# Patient Record
Sex: MALE | Race: WHITE | NOT HISPANIC OR LATINO | URBAN - METROPOLITAN AREA
[De-identification: names, ages, dates, MRNs, and addresses within clinical notes are randomized per-mention and may not be internally consistent; named-entity substitution may affect disease eponyms.]

---

## 2018-03-07 ENCOUNTER — OUTPATIENT (OUTPATIENT)
Dept: OUTPATIENT SERVICES | Facility: HOSPITAL | Age: 73
LOS: 1 days | End: 2018-03-07

## 2018-03-07 ENCOUNTER — INPATIENT (INPATIENT)
Facility: HOSPITAL | Age: 73
LOS: 2 days | Discharge: HOME | End: 2018-03-10
Payer: MEDICARE

## 2018-03-07 PROCEDURE — 73552 X-RAY EXAM OF FEMUR 2/>: CPT | Mod: 26,LT

## 2018-03-07 PROCEDURE — 73590 X-RAY EXAM OF LOWER LEG: CPT | Mod: 26,LT

## 2018-03-07 PROCEDURE — 93971 EXTREMITY STUDY: CPT | Mod: 26,LT

## 2018-03-07 PROCEDURE — 99284 EMERGENCY DEPT VISIT MOD MDM: CPT

## 2018-03-08 ENCOUNTER — OUTPATIENT (OUTPATIENT)
Dept: OUTPATIENT SERVICES | Facility: HOSPITAL | Age: 73
LOS: 1 days | End: 2018-03-08

## 2018-03-09 ENCOUNTER — OUTPATIENT (OUTPATIENT)
Dept: OUTPATIENT SERVICES | Facility: HOSPITAL | Age: 73
LOS: 1 days | End: 2018-03-09

## 2018-03-10 ENCOUNTER — OUTPATIENT (OUTPATIENT)
Dept: OUTPATIENT SERVICES | Facility: HOSPITAL | Age: 73
LOS: 1 days | End: 2018-03-10

## 2018-03-30 ENCOUNTER — TRANSCRIPTION ENCOUNTER (OUTPATIENT)
Age: 73
End: 2018-03-30

## 2018-03-30 ENCOUNTER — EMERGENCY (EMERGENCY)
Facility: HOSPITAL | Age: 73
LOS: 1 days | End: 2018-03-30
Payer: MEDICARE

## 2018-03-30 PROCEDURE — 99284 EMERGENCY DEPT VISIT MOD MDM: CPT

## 2018-03-30 PROCEDURE — 71045 X-RAY EXAM CHEST 1 VIEW: CPT | Mod: 26

## 2018-07-24 ENCOUNTER — OUTPATIENT (OUTPATIENT)
Dept: OUTPATIENT SERVICES | Facility: HOSPITAL | Age: 73
LOS: 1 days | Discharge: HOME | End: 2018-07-24

## 2018-09-21 ENCOUNTER — OUTPATIENT (OUTPATIENT)
Dept: OUTPATIENT SERVICES | Facility: HOSPITAL | Age: 73
LOS: 1 days | End: 2018-09-21

## 2018-10-05 ENCOUNTER — OUTPATIENT (OUTPATIENT)
Dept: OUTPATIENT SERVICES | Facility: HOSPITAL | Age: 73
LOS: 1 days | End: 2018-10-05

## 2018-10-11 ENCOUNTER — OUTPATIENT (OUTPATIENT)
Dept: OUTPATIENT SERVICES | Facility: HOSPITAL | Age: 73
LOS: 1 days | End: 2018-10-11

## 2021-01-18 ENCOUNTER — APPOINTMENT (OUTPATIENT)
Dept: ULTRASOUND IMAGING | Facility: CLINIC | Age: 76
End: 2021-01-18
Payer: MEDICARE

## 2021-01-18 PROCEDURE — 76536 US EXAM OF HEAD AND NECK: CPT

## 2021-04-06 ENCOUNTER — APPOINTMENT (OUTPATIENT)
Dept: ULTRASOUND IMAGING | Facility: CLINIC | Age: 76
End: 2021-04-06
Payer: MEDICARE

## 2021-04-06 PROCEDURE — 76770 US EXAM ABDO BACK WALL COMP: CPT

## 2021-09-23 PROBLEM — Z00.00 ENCOUNTER FOR PREVENTIVE HEALTH EXAMINATION: Status: ACTIVE | Noted: 2021-09-23

## 2021-09-29 ENCOUNTER — APPOINTMENT (OUTPATIENT)
Dept: UROLOGY | Facility: CLINIC | Age: 76
End: 2021-09-29
Payer: MEDICARE

## 2021-09-29 ENCOUNTER — NON-APPOINTMENT (OUTPATIENT)
Age: 76
End: 2021-09-29

## 2021-09-29 VITALS
WEIGHT: 198 LBS | HEART RATE: 64 BPM | TEMPERATURE: 97.4 F | BODY MASS INDEX: 28.35 KG/M2 | HEIGHT: 70 IN | DIASTOLIC BLOOD PRESSURE: 76 MMHG | SYSTOLIC BLOOD PRESSURE: 130 MMHG

## 2021-09-29 DIAGNOSIS — Z82.49 FAMILY HISTORY OF ISCHEMIC HEART DISEASE AND OTHER DISEASES OF THE CIRCULATORY SYSTEM: ICD-10-CM

## 2021-09-29 DIAGNOSIS — N40.0 BENIGN PROSTATIC HYPERPLASIA WITHOUT LOWER URINARY TRACT SYMPMS: ICD-10-CM

## 2021-09-29 DIAGNOSIS — M79.89 OTHER SPECIFIED SOFT TISSUE DISORDERS: ICD-10-CM

## 2021-09-29 LAB
BILIRUB UR QL STRIP: NEGATIVE
CLARITY UR: CLEAR
COLLECTION METHOD: NORMAL
GLUCOSE UR-MCNC: NEGATIVE
HCG UR QL: 0.2 EU/DL
HGB UR QL STRIP.AUTO: NEGATIVE
KETONES UR-MCNC: NEGATIVE
LEUKOCYTE ESTERASE UR QL STRIP: NEGATIVE
NITRITE UR QL STRIP: NEGATIVE
PH UR STRIP: 5.5
PROT UR STRIP-MCNC: NEGATIVE
SP GR UR STRIP: 1.01

## 2021-09-29 PROCEDURE — 99204 OFFICE O/P NEW MOD 45 MIN: CPT

## 2021-09-29 RX ORDER — LITHIUM CARBONATE 300 MG/1
300 TABLET, FILM COATED, EXTENDED RELEASE ORAL
Refills: 0 | Status: ACTIVE | COMMUNITY

## 2021-09-29 RX ORDER — ADHESIVE TAPE 3"X 2.3 YD
50 MCG TAPE, NON-MEDICATED TOPICAL
Refills: 0 | Status: ACTIVE | COMMUNITY

## 2021-10-03 LAB — BACTERIA UR CULT: NORMAL

## 2021-11-08 ENCOUNTER — APPOINTMENT (OUTPATIENT)
Dept: UROLOGY | Facility: CLINIC | Age: 76
End: 2021-11-08
Payer: MEDICARE

## 2021-11-08 VITALS
BODY MASS INDEX: 28.35 KG/M2 | DIASTOLIC BLOOD PRESSURE: 84 MMHG | HEIGHT: 70 IN | SYSTOLIC BLOOD PRESSURE: 126 MMHG | TEMPERATURE: 97.8 F | WEIGHT: 198 LBS | HEART RATE: 69 BPM

## 2021-11-08 PROCEDURE — 99213 OFFICE O/P EST LOW 20 MIN: CPT

## 2021-12-06 ENCOUNTER — APPOINTMENT (OUTPATIENT)
Dept: UROLOGY | Facility: CLINIC | Age: 76
End: 2021-12-06
Payer: MEDICARE

## 2021-12-06 VITALS
DIASTOLIC BLOOD PRESSURE: 84 MMHG | BODY MASS INDEX: 28.35 KG/M2 | TEMPERATURE: 97.5 F | HEART RATE: 75 BPM | WEIGHT: 198 LBS | HEIGHT: 70 IN | SYSTOLIC BLOOD PRESSURE: 132 MMHG

## 2021-12-06 PROCEDURE — 99213 OFFICE O/P EST LOW 20 MIN: CPT

## 2021-12-06 RX ORDER — MIRABEGRON 25 MG/1
25 TABLET, FILM COATED, EXTENDED RELEASE ORAL
Qty: 90 | Refills: 0 | Status: DISCONTINUED | COMMUNITY
Start: 2021-11-08 | End: 2021-12-06

## 2021-12-22 ENCOUNTER — NON-APPOINTMENT (OUTPATIENT)
Age: 76
End: 2021-12-22

## 2022-01-09 ENCOUNTER — NON-APPOINTMENT (OUTPATIENT)
Age: 77
End: 2022-01-09

## 2022-01-10 ENCOUNTER — RX RENEWAL (OUTPATIENT)
Age: 77
End: 2022-01-10

## 2022-01-19 ENCOUNTER — APPOINTMENT (OUTPATIENT)
Dept: UROLOGY | Facility: CLINIC | Age: 77
End: 2022-01-19
Payer: MEDICARE

## 2022-01-19 VITALS
BODY MASS INDEX: 28.35 KG/M2 | HEART RATE: 64 BPM | DIASTOLIC BLOOD PRESSURE: 77 MMHG | WEIGHT: 198 LBS | SYSTOLIC BLOOD PRESSURE: 124 MMHG | HEIGHT: 70 IN | TEMPERATURE: 97.5 F

## 2022-01-19 PROCEDURE — 51798 US URINE CAPACITY MEASURE: CPT

## 2022-01-19 PROCEDURE — 99213 OFFICE O/P EST LOW 20 MIN: CPT

## 2022-02-09 ENCOUNTER — APPOINTMENT (OUTPATIENT)
Dept: UROLOGY | Facility: CLINIC | Age: 77
End: 2022-02-09
Payer: MEDICARE

## 2022-02-09 VITALS
HEIGHT: 70 IN | BODY MASS INDEX: 27.92 KG/M2 | OXYGEN SATURATION: 98 % | HEART RATE: 68 BPM | WEIGHT: 195 LBS | DIASTOLIC BLOOD PRESSURE: 74 MMHG | TEMPERATURE: 96.8 F | SYSTOLIC BLOOD PRESSURE: 123 MMHG

## 2022-02-09 DIAGNOSIS — N32.81 OVERACTIVE BLADDER: ICD-10-CM

## 2022-02-09 LAB
BILIRUB UR QL STRIP: NEGATIVE
CLARITY UR: CLEAR
COLLECTION METHOD: NORMAL
GLUCOSE UR-MCNC: NEGATIVE
HCG UR QL: 0.2 EU/DL
HGB UR QL STRIP.AUTO: NEGATIVE
KETONES UR-MCNC: NEGATIVE
LEUKOCYTE ESTERASE UR QL STRIP: NEGATIVE
NITRITE UR QL STRIP: NEGATIVE
PH UR STRIP: 7
PROT UR STRIP-MCNC: NEGATIVE
SP GR UR STRIP: 1.01

## 2022-02-09 PROCEDURE — 81003 URINALYSIS AUTO W/O SCOPE: CPT | Mod: QW

## 2022-02-09 PROCEDURE — 51784 ANAL/URINARY MUSCLE STUDY: CPT

## 2022-02-09 PROCEDURE — 51741 ELECTRO-UROFLOWMETRY FIRST: CPT

## 2022-02-09 PROCEDURE — 51728 CYSTOMETROGRAM W/VP: CPT

## 2022-02-09 PROCEDURE — 51798 US URINE CAPACITY MEASURE: CPT

## 2022-02-09 PROCEDURE — 51797 INTRAABDOMINAL PRESSURE TEST: CPT

## 2022-04-13 ENCOUNTER — APPOINTMENT (OUTPATIENT)
Dept: UROLOGY | Facility: CLINIC | Age: 77
End: 2022-04-13
Payer: MEDICARE

## 2022-04-13 VITALS
WEIGHT: 195 LBS | HEIGHT: 70 IN | BODY MASS INDEX: 27.92 KG/M2 | DIASTOLIC BLOOD PRESSURE: 68 MMHG | TEMPERATURE: 97.3 F | SYSTOLIC BLOOD PRESSURE: 116 MMHG | HEART RATE: 82 BPM

## 2022-04-13 PROCEDURE — 99213 OFFICE O/P EST LOW 20 MIN: CPT

## 2022-06-21 ENCOUNTER — APPOINTMENT (OUTPATIENT)
Dept: ULTRASOUND IMAGING | Facility: CLINIC | Age: 77
End: 2022-06-21

## 2022-06-21 PROCEDURE — 76770 US EXAM ABDO BACK WALL COMP: CPT

## 2022-10-12 ENCOUNTER — APPOINTMENT (OUTPATIENT)
Dept: UROLOGY | Facility: CLINIC | Age: 77
End: 2022-10-12

## 2022-10-12 VITALS
DIASTOLIC BLOOD PRESSURE: 74 MMHG | SYSTOLIC BLOOD PRESSURE: 110 MMHG | BODY MASS INDEX: 28.35 KG/M2 | TEMPERATURE: 97.4 F | HEART RATE: 61 BPM | HEIGHT: 70 IN | WEIGHT: 198 LBS

## 2022-10-12 PROCEDURE — 99214 OFFICE O/P EST MOD 30 MIN: CPT

## 2022-10-12 NOTE — LETTER BODY
[Dear  ___] : Dear  [unfilled], [Consult Letter:] : I had the pleasure of evaluating your patient, [unfilled]. [Please see my note below.] : Please see my note below. [Consult Closing:] : Thank you very much for allowing me to participate in the care of this patient.  If you have any questions, please do not hesitate to contact me. [Sincerely,] : Sincerely, [FreeTextEntry3] : Ledy Lieberman, \par Genitourinary Medicine\par [facility]\par

## 2022-10-12 NOTE — ASSESSMENT
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 \par c/w amiloride\par will try course of desmopressin at low dose if BMP is normal - may not be effective as diabetes insipidus is nephrogenic source\par f/u with Dr. Aguilar for management\par

## 2022-10-12 NOTE — HISTORY OF PRESENT ILLNESS
[Currently Experiencing ___] :  [unfilled] [Urinary Frequency] : urinary frequency [Post-Void Dribbling] : post-void dribbling [None] : None [FreeTextEntry1] : 76yo M (Earl) hx of bipolar disorder on lithium, BPH on 2 capsule tamsulosin + finasteride x 3 years returns for f/u\par He was seen by Dr. Aguilar (Nephrology) for nephrogenic diabetes insipidus from chronic lithium use. \par He is currently taking Amiloride 5mg daily. \par \par He is polydyspia and polyuria from diabetes insipidus. He becomes very thirsty at nighttime and will drink water every 2 hours - advised to stop.\par \par I have performed urodynamics study few months ago which didn't show any obstruction or bladder overactivity. \par \par Renal Bladder US 6/2022 showed prostate volume 28g, PVR 10cc \par PSA 0.4 on 3/21\par \par  [Urinary Incontinence] : no urinary incontinence

## 2022-10-25 ENCOUNTER — RX RENEWAL (OUTPATIENT)
Age: 77
End: 2022-10-25

## 2022-11-08 ENCOUNTER — RX ONLY (RX ONLY)
Age: 77
End: 2022-11-08

## 2022-11-08 ENCOUNTER — OFFICE (OUTPATIENT)
Dept: URBAN - METROPOLITAN AREA CLINIC 38 | Facility: CLINIC | Age: 77
Setting detail: OPHTHALMOLOGY
End: 2022-11-08
Payer: MEDICARE

## 2022-11-08 DIAGNOSIS — H10.89: ICD-10-CM

## 2022-11-08 PROCEDURE — 99213 OFFICE O/P EST LOW 20 MIN: CPT | Performed by: OPHTHALMOLOGY

## 2022-11-08 ASSESSMENT — LID EXAM ASSESSMENTS
OS_BLEPHARITIS: LLL LUL 1+
OD_BLEPHARITIS: RLL RUL 1+

## 2022-11-08 ASSESSMENT — REFRACTION_AUTOREFRACTION
OD_CYLINDER: +0.25
OS_SPHERE: -0.25
OD_SPHERE: 0.00
OS_CYLINDER: +0.75
OD_AXIS: 085
OS_AXIS: 107

## 2022-11-08 ASSESSMENT — CONFRONTATIONAL VISUAL FIELD TEST (CVF)
OD_FINDINGS: FULL
OS_FINDINGS: FULL

## 2022-11-08 ASSESSMENT — REFRACTION_MANIFEST
OS_VA2: 20/25(J1)
OD_VA2: 20/25(J1)
OD_ADD: +2.50
OD_VA1: 20/20
OD_SPHERE: -0.50
OS_VA1: 20/20
OS_SPHERE: -0.50
OD_CYLINDER: SPH
OS_ADD: +2.50
OU_VA: 20/20
OS_CYLINDER: SPH

## 2022-11-08 ASSESSMENT — REFRACTION_CURRENTRX
OD_OVR_VA: 20/
OS_OVR_VA: 20/
OD_CYLINDER: SPH
OD_VPRISM_DIRECTION: SV
OD_SPHERE: +2.00
OS_CYLINDER: SPH
OS_SPHERE: +1.25
OD_CYLINDER: SPHERE
OS_CYLINDER: SPHERE
OS_OVR_VA: 20/
OS_SPHERE: +2.00
OD_SPHERE: +1.25
OD_OVR_VA: 20/
OS_VPRISM_DIRECTION: SV

## 2022-11-08 ASSESSMENT — SPHEQUIV_DERIVED
OD_SPHEQUIV: 0.125
OS_SPHEQUIV: 0.125

## 2022-11-08 ASSESSMENT — KERATOMETRY
METHOD_AUTO_MANUAL: AUTO
OD_K1POWER_DIOPTERS: 42.25
OS_K1POWER_DIOPTERS: 42.50
OD_K2POWER_DIOPTERS: 43.00
OS_K2POWER_DIOPTERS: 43.50
OD_AXISANGLE_DEGREES: 078
OS_AXISANGLE_DEGREES: 090

## 2022-11-08 ASSESSMENT — TONOMETRY: OS_IOP_MMHG: 16

## 2022-11-08 ASSESSMENT — AXIALLENGTH_DERIVED
OS_AL: 23.7276
OD_AL: 23.8678

## 2022-11-08 ASSESSMENT — VISUAL ACUITY
OD_BCVA: 20/20-
OS_BCVA: 20/20-2

## 2022-11-15 ENCOUNTER — APPOINTMENT (OUTPATIENT)
Dept: UROLOGY | Facility: CLINIC | Age: 77
End: 2022-11-15

## 2022-11-15 VITALS
BODY MASS INDEX: 28.35 KG/M2 | SYSTOLIC BLOOD PRESSURE: 132 MMHG | TEMPERATURE: 97.1 F | DIASTOLIC BLOOD PRESSURE: 71 MMHG | HEIGHT: 70 IN | WEIGHT: 198 LBS | HEART RATE: 74 BPM

## 2022-11-15 PROCEDURE — 99214 OFFICE O/P EST MOD 30 MIN: CPT

## 2022-11-15 NOTE — ASSESSMENT
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 \par c/w amiloride\par start course of desmopressin at low dose if BMP is normal - may not be effective as diabetes insipidus is nephrogenic source\par if desmopressin doesn't improve symptoms, will stop medication and f/u yearly\par f/u with Dr. Aguilar for management

## 2022-11-15 NOTE — HISTORY OF PRESENT ILLNESS
[FreeTextEntry1] : 76yo M (Earl) hx of bipolar disorder on lithium, BPH on 2 capsule tamsulosin + finasteride > 4 years returns for f/u\par He was seen by Dr. Aguilar (Nephrology) for nephrogenic diabetes insipidus from chronic lithium use. \par He is currently taking Amiloride 5mg daily. \par \par I prescribed desmopressin 0.2mg but [facility] didn't have it available. \par \par He has polydyspia and polyuria from diabetes insipidus. He becomes very thirsty at nighttime and will drink water every 2 hours - advised to stop.\par \par I have performed urodynamics study 9/2022 which didn't show any obstruction or bladder overactivity. \par \par Renal Bladder US 6/2022 showed prostate volume 28g, PVR 10cc \par PSA 0.4 on 3/21\par \par  [Currently Experiencing ___] :  [unfilled] [Urinary Incontinence] : no urinary incontinence [Urinary Frequency] : urinary frequency [Post-Void Dribbling] : post-void dribbling [None] : None

## 2022-12-05 ENCOUNTER — RX RENEWAL (OUTPATIENT)
Age: 77
End: 2022-12-05

## 2023-01-03 ENCOUNTER — APPOINTMENT (OUTPATIENT)
Dept: UROLOGY | Facility: CLINIC | Age: 78
End: 2023-01-03
Payer: MEDICARE

## 2023-01-03 VITALS
SYSTOLIC BLOOD PRESSURE: 120 MMHG | DIASTOLIC BLOOD PRESSURE: 72 MMHG | TEMPERATURE: 97.2 F | HEART RATE: 76 BPM | HEIGHT: 70 IN | WEIGHT: 196 LBS | BODY MASS INDEX: 28.06 KG/M2

## 2023-01-03 DIAGNOSIS — N25.1 NEPHROGENIC DIABETES INSIPIDUS: ICD-10-CM

## 2023-01-03 PROCEDURE — 99213 OFFICE O/P EST LOW 20 MIN: CPT

## 2023-01-03 NOTE — HISTORY OF PRESENT ILLNESS
[Currently Experiencing ___] :  [unfilled] [Urinary Frequency] : urinary frequency [Post-Void Dribbling] : post-void dribbling [None] : None [FreeTextEntry1] : 76yo M (Earl) hx of bipolar disorder on lithium, BPH on 2 capsule tamsulosin returns for f/u\par Stopped his finasteride 2 months ago. \par He has polydyspia and polyuria related to diabetes insipidus. He becomes very thirsty at nighttime and will drink water every 2 hours - advised to stop.\par Trail of desmopressin didn't improve his symptoms. \par \par He was seen by Dr. Aguilar (Nephrology) for nephrogenic diabetes insipidus from chronic lithium use. \par He is currently taking Amiloride 5mg daily for DI\par \par I have performed urodynamics study 9/2022 which didn't show any obstruction or bladder overactivity. \par \par Renal Bladder US 6/2022 showed prostate volume 28g, PVR 10cc \par PSA 0.78 on 12/2022 \par  [Urinary Incontinence] : no urinary incontinence

## 2023-01-03 NOTE — LETTER BODY
[Dear  ___] : Dear  [unfilled], [Consult Letter:] : I had the pleasure of evaluating your patient, [unfilled]. [Please see my note below.] : Please see my note below. [Consult Closing:] : Thank you very much for allowing me to participate in the care of this patient.  If you have any questions, please do not hesitate to contact me. [Sincerely,] : Sincerely, [FreeTextEntry3] : Ledy Lieberman, \par Genitourinary Medicine\par [facility]\par

## 2023-01-03 NOTE — ASSESSMENT
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 \par c/w amiloride\par Advised to f/u with nephrologist\par \par

## 2023-01-12 ENCOUNTER — OFFICE (OUTPATIENT)
Dept: URBAN - METROPOLITAN AREA CLINIC 9 | Facility: CLINIC | Age: 78
Setting detail: OPHTHALMOLOGY
End: 2023-01-12
Payer: MEDICARE

## 2023-01-12 DIAGNOSIS — H43.393: ICD-10-CM

## 2023-01-12 DIAGNOSIS — H53.19: ICD-10-CM

## 2023-01-12 DIAGNOSIS — H43.811: ICD-10-CM

## 2023-01-12 DIAGNOSIS — H01.001: ICD-10-CM

## 2023-01-12 DIAGNOSIS — H35.412: ICD-10-CM

## 2023-01-12 DIAGNOSIS — H25.13: ICD-10-CM

## 2023-01-12 DIAGNOSIS — H01.004: ICD-10-CM

## 2023-01-12 PROCEDURE — 92201 OPSCPY EXTND RTA DRAW UNI/BI: CPT | Performed by: OPHTHALMOLOGY

## 2023-01-12 PROCEDURE — 99213 OFFICE O/P EST LOW 20 MIN: CPT | Performed by: OPHTHALMOLOGY

## 2023-01-12 ASSESSMENT — REFRACTION_CURRENTRX
OS_OVR_VA: 20/
OS_SPHERE: +2.00
OD_CYLINDER: SPHERE
OD_CYLINDER: SPH
OD_OVR_VA: 20/
OD_OVR_VA: 20/
OS_CYLINDER: SPH
OS_VPRISM_DIRECTION: SV
OS_CYLINDER: SPHERE
OD_SPHERE: +2.00
OS_SPHERE: +1.25
OD_VPRISM_DIRECTION: SV
OS_OVR_VA: 20/
OD_SPHERE: +1.25

## 2023-01-12 ASSESSMENT — KERATOMETRY
OS_K2POWER_DIOPTERS: 43.50
OD_K1POWER_DIOPTERS: 42.25
OS_K1POWER_DIOPTERS: 42.50
OS_AXISANGLE_DEGREES: 090
OD_K2POWER_DIOPTERS: 43.00
METHOD_AUTO_MANUAL: AUTO
OD_AXISANGLE_DEGREES: 078

## 2023-01-12 ASSESSMENT — REFRACTION_MANIFEST
OS_SPHERE: -0.50
OU_VA: 20/20
OS_VA1: 20/20
OD_VA1: 20/20
OD_CYLINDER: SPH
OS_VA2: 20/25(J1)
OS_CYLINDER: SPH
OS_ADD: +2.50
OD_ADD: +2.50
OD_VA2: 20/25(J1)
OD_SPHERE: -0.50

## 2023-01-12 ASSESSMENT — CONFRONTATIONAL VISUAL FIELD TEST (CVF)
OD_FINDINGS: FULL
OS_FINDINGS: FULL

## 2023-01-12 ASSESSMENT — VISUAL ACUITY
OD_BCVA: 20/20-1
OS_BCVA: 20/25+2

## 2023-01-12 ASSESSMENT — REFRACTION_AUTOREFRACTION
OD_AXIS: 085
OS_SPHERE: -0.25
OS_AXIS: 107
OD_CYLINDER: +0.25
OS_CYLINDER: +0.75
OD_SPHERE: 0.00

## 2023-01-12 ASSESSMENT — SPHEQUIV_DERIVED
OS_SPHEQUIV: 0.125
OD_SPHEQUIV: 0.125

## 2023-01-12 ASSESSMENT — LID EXAM ASSESSMENTS
OS_BLEPHARITIS: LUL 2+ 3+
OD_BLEPHARITIS: RUL 2+ 3+
OS_COMMENTS: WITH DISTICHIASIS

## 2023-01-12 ASSESSMENT — AXIALLENGTH_DERIVED
OD_AL: 23.8678
OS_AL: 23.7276

## 2023-01-26 ENCOUNTER — OFFICE (OUTPATIENT)
Dept: URBAN - METROPOLITAN AREA CLINIC 9 | Facility: CLINIC | Age: 78
Setting detail: OPHTHALMOLOGY
End: 2023-01-26
Payer: MEDICARE

## 2023-01-26 DIAGNOSIS — H01.001: ICD-10-CM

## 2023-01-26 DIAGNOSIS — H01.004: ICD-10-CM

## 2023-01-26 DIAGNOSIS — H25.13: ICD-10-CM

## 2023-01-26 DIAGNOSIS — H43.393: ICD-10-CM

## 2023-01-26 DIAGNOSIS — H35.412: ICD-10-CM

## 2023-01-26 DIAGNOSIS — H02.012: ICD-10-CM

## 2023-01-26 DIAGNOSIS — H43.811: ICD-10-CM

## 2023-01-26 PROCEDURE — 92250 FUNDUS PHOTOGRAPHY W/I&R: CPT | Performed by: OPHTHALMOLOGY

## 2023-01-26 PROCEDURE — 99213 OFFICE O/P EST LOW 20 MIN: CPT | Performed by: OPHTHALMOLOGY

## 2023-01-26 ASSESSMENT — REFRACTION_MANIFEST
OU_VA: 20/20
OS_VA1: 20/20
OD_VA1: 20/20
OS_ADD: +2.50
OD_VA2: 20/25(J1)
OS_CYLINDER: SPH
OD_SPHERE: -0.50
OS_VA2: 20/25(J1)
OD_ADD: +2.50
OD_CYLINDER: SPH
OS_SPHERE: -0.50

## 2023-01-26 ASSESSMENT — LID EXAM ASSESSMENTS
OS_COMMENTS: WITH DISTICHIASIS
OS_BLEPHARITIS: LUL 2+ 3+
OD_BLEPHARITIS: RUL 2+ 3+
OD_TRICHIASIS: RLL T

## 2023-01-26 ASSESSMENT — REFRACTION_CURRENTRX
OD_VPRISM_DIRECTION: SV
OD_SPHERE: +1.25
OD_OVR_VA: 20/
OS_VPRISM_DIRECTION: SV
OS_OVR_VA: 20/
OD_CYLINDER: SPH
OS_SPHERE: +1.25
OD_SPHERE: +2.00
OD_OVR_VA: 20/
OS_CYLINDER: SPH
OS_CYLINDER: SPHERE
OD_CYLINDER: SPHERE
OS_SPHERE: +2.00
OS_OVR_VA: 20/

## 2023-01-26 ASSESSMENT — SPHEQUIV_DERIVED
OD_SPHEQUIV: 0.125
OS_SPHEQUIV: 0.125

## 2023-01-26 ASSESSMENT — KERATOMETRY
OS_K2POWER_DIOPTERS: 43.50
METHOD_AUTO_MANUAL: AUTO
OS_K1POWER_DIOPTERS: 42.50
OD_AXISANGLE_DEGREES: 078
OD_K2POWER_DIOPTERS: 43.00
OS_AXISANGLE_DEGREES: 090
OD_K1POWER_DIOPTERS: 42.25

## 2023-01-26 ASSESSMENT — VISUAL ACUITY
OS_BCVA: 20/25+2
OD_BCVA: 20/20-1

## 2023-01-26 ASSESSMENT — REFRACTION_AUTOREFRACTION
OD_CYLINDER: +0.25
OS_CYLINDER: +0.75
OD_AXIS: 085
OS_SPHERE: -0.25
OS_AXIS: 107
OD_SPHERE: 0.00

## 2023-01-26 ASSESSMENT — AXIALLENGTH_DERIVED
OD_AL: 23.8678
OS_AL: 23.7276

## 2023-01-26 ASSESSMENT — CONFRONTATIONAL VISUAL FIELD TEST (CVF)
OD_FINDINGS: FULL
OS_FINDINGS: FULL

## 2023-04-18 ENCOUNTER — APPOINTMENT (OUTPATIENT)
Dept: CT IMAGING | Facility: CLINIC | Age: 78
End: 2023-04-18
Payer: MEDICARE

## 2023-04-18 PROCEDURE — 70491 CT SOFT TISSUE NECK W/DYE: CPT | Mod: MH

## 2023-04-18 PROCEDURE — 70460 CT HEAD/BRAIN W/DYE: CPT | Mod: MH

## 2023-06-14 ENCOUNTER — OFFICE (OUTPATIENT)
Dept: URBAN - METROPOLITAN AREA CLINIC 38 | Facility: CLINIC | Age: 78
Setting detail: OPHTHALMOLOGY
End: 2023-06-14
Payer: MEDICARE

## 2023-06-14 DIAGNOSIS — H02.89: ICD-10-CM

## 2023-06-14 DIAGNOSIS — H10.9: ICD-10-CM

## 2023-06-14 PROCEDURE — 99213 OFFICE O/P EST LOW 20 MIN: CPT

## 2023-06-14 ASSESSMENT — KERATOMETRY
OD_AXISANGLE_DEGREES: 078
OS_K2POWER_DIOPTERS: 43.50
METHOD_AUTO_MANUAL: AUTO
OD_K1POWER_DIOPTERS: 42.25
OS_AXISANGLE_DEGREES: 090
OS_K1POWER_DIOPTERS: 42.50
OD_K2POWER_DIOPTERS: 43.00

## 2023-06-14 ASSESSMENT — REFRACTION_MANIFEST
OS_VA1: 20/20
OD_SPHERE: -0.50
OD_CYLINDER: SPH
OS_CYLINDER: SPH
OU_VA: 20/20
OS_VA2: 20/25(J1)
OD_VA2: 20/25(J1)
OS_ADD: +2.50
OS_SPHERE: -0.50
OD_ADD: +2.50
OD_VA1: 20/20

## 2023-06-14 ASSESSMENT — REFRACTION_CURRENTRX
OD_CYLINDER: SPH
OS_OVR_VA: 20/
OS_SPHERE: +2.00
OS_VPRISM_DIRECTION: SV
OS_OVR_VA: 20/
OS_CYLINDER: SPH
OD_OVR_VA: 20/
OD_SPHERE: +2.00
OS_SPHERE: +1.25
OD_VPRISM_DIRECTION: SV
OD_SPHERE: +1.25
OD_CYLINDER: SPHERE
OD_OVR_VA: 20/
OS_CYLINDER: SPHERE

## 2023-06-14 ASSESSMENT — TONOMETRY
OD_IOP_MMHG: 12
OS_IOP_MMHG: 12

## 2023-06-14 ASSESSMENT — REFRACTION_AUTOREFRACTION
OD_CYLINDER: +0.25
OS_SPHERE: -0.25
OD_AXIS: 085
OS_AXIS: 107
OS_CYLINDER: +0.75
OD_SPHERE: 0.00

## 2023-06-14 ASSESSMENT — CONFRONTATIONAL VISUAL FIELD TEST (CVF)
OD_FINDINGS: FULL
OS_FINDINGS: FULL

## 2023-06-14 ASSESSMENT — SPHEQUIV_DERIVED
OD_SPHEQUIV: 0.125
OS_SPHEQUIV: 0.125

## 2023-06-14 ASSESSMENT — VISUAL ACUITY
OD_BCVA: 20/25
OS_BCVA: 20/25-

## 2023-06-14 ASSESSMENT — AXIALLENGTH_DERIVED
OS_AL: 23.7276
OD_AL: 23.8678

## 2023-06-21 ENCOUNTER — OFFICE (OUTPATIENT)
Dept: URBAN - METROPOLITAN AREA CLINIC 38 | Facility: CLINIC | Age: 78
Setting detail: OPHTHALMOLOGY
End: 2023-06-21
Payer: MEDICARE

## 2023-06-21 ENCOUNTER — RX ONLY (RX ONLY)
Age: 78
End: 2023-06-21

## 2023-06-21 DIAGNOSIS — H02.012: ICD-10-CM

## 2023-06-21 PROBLEM — H16.221 DRY EYE SYNDROME K SICCA; RIGHT EYE: Status: ACTIVE | Noted: 2023-06-21

## 2023-06-21 PROBLEM — H02.89 MEIBOMIAN GLAND DYSFUNCTION ; BOTH EYES: Status: ACTIVE | Noted: 2023-06-14

## 2023-06-21 PROBLEM — H10.9 CONJUNCTIVITIS, UNSPECIFIED: Status: RESOLVED | Noted: 2023-06-14 | Resolved: 2023-06-21

## 2023-06-21 PROCEDURE — 67820 REVISE EYELASHES: CPT

## 2023-06-21 ASSESSMENT — REFRACTION_MANIFEST
OS_VA2: 20/25(J1)
OD_VA2: 20/25(J1)
OD_VA1: 20/20
OD_ADD: +2.50
OS_ADD: +2.50
OS_CYLINDER: SPH
OD_SPHERE: -0.50
OS_SPHERE: -0.50
OS_VA1: 20/20
OD_CYLINDER: SPH
OU_VA: 20/20

## 2023-06-21 ASSESSMENT — KERATOMETRY
OS_K2POWER_DIOPTERS: 43.50
OS_AXISANGLE_DEGREES: 090
OD_AXISANGLE_DEGREES: 078
OS_K1POWER_DIOPTERS: 42.50
OD_K2POWER_DIOPTERS: 43.00
METHOD_AUTO_MANUAL: AUTO
OD_K1POWER_DIOPTERS: 42.25

## 2023-06-21 ASSESSMENT — CONFRONTATIONAL VISUAL FIELD TEST (CVF)
OS_FINDINGS: FULL
OD_FINDINGS: FULL

## 2023-06-21 ASSESSMENT — REFRACTION_CURRENTRX
OD_CYLINDER: SPHERE
OD_CYLINDER: SPH
OS_SPHERE: +2.00
OS_OVR_VA: 20/
OS_VPRISM_DIRECTION: SV
OD_SPHERE: +2.00
OD_VPRISM_DIRECTION: SV
OS_OVR_VA: 20/
OS_CYLINDER: SPH
OD_OVR_VA: 20/
OS_SPHERE: +1.25
OS_CYLINDER: SPHERE
OD_SPHERE: +1.25
OD_OVR_VA: 20/

## 2023-06-21 ASSESSMENT — SUPERFICIAL PUNCTATE KERATITIS (SPK): OD_SPK: 2+

## 2023-06-21 ASSESSMENT — REFRACTION_AUTOREFRACTION
OS_AXIS: 107
OD_AXIS: 085
OD_CYLINDER: +0.25
OD_SPHERE: 0.00
OS_SPHERE: -0.25
OS_CYLINDER: +0.75

## 2023-06-21 ASSESSMENT — VISUAL ACUITY
OD_BCVA: 20/25-2
OS_BCVA: 20/30+2

## 2023-06-21 ASSESSMENT — SPHEQUIV_DERIVED
OD_SPHEQUIV: 0.125
OS_SPHEQUIV: 0.125

## 2023-06-21 ASSESSMENT — AXIALLENGTH_DERIVED
OD_AL: 23.8678
OS_AL: 23.7276

## 2023-06-21 ASSESSMENT — LID EXAM ASSESSMENTS: OD_TRICHIASIS: RLL

## 2023-06-21 ASSESSMENT — TONOMETRY
OS_IOP_MMHG: 17
OD_IOP_MMHG: 18

## 2023-10-30 ENCOUNTER — RX RENEWAL (OUTPATIENT)
Age: 78
End: 2023-10-30

## 2023-11-22 ENCOUNTER — APPOINTMENT (OUTPATIENT)
Dept: ULTRASOUND IMAGING | Facility: CLINIC | Age: 78
End: 2023-11-22
Payer: MEDICARE

## 2023-11-22 ENCOUNTER — OFFICE (OUTPATIENT)
Dept: URBAN - METROPOLITAN AREA CLINIC 38 | Facility: CLINIC | Age: 78
Setting detail: OPHTHALMOLOGY
End: 2023-11-22
Payer: MEDICARE

## 2023-11-22 DIAGNOSIS — H01.001: ICD-10-CM

## 2023-11-22 DIAGNOSIS — H01.004: ICD-10-CM

## 2023-11-22 DIAGNOSIS — H10.9: ICD-10-CM

## 2023-11-22 DIAGNOSIS — H02.012: ICD-10-CM

## 2023-11-22 PROCEDURE — 93971 EXTREMITY STUDY: CPT | Mod: RT

## 2023-11-22 PROCEDURE — 67820 REVISE EYELASHES: CPT | Mod: E4

## 2023-11-22 PROCEDURE — 99213 OFFICE O/P EST LOW 20 MIN: CPT | Mod: 25

## 2023-11-22 ASSESSMENT — LID EXAM ASSESSMENTS
OS_BLEPHARITIS: LUL 1+ 2+
OS_COMMENTS: WITH DISTICHIASIS
OD_BLEPHARITIS: RUL 1+ 2+
OD_TRICHIASIS: RLL 1+

## 2023-11-22 ASSESSMENT — CONFRONTATIONAL VISUAL FIELD TEST (CVF)
OS_FINDINGS: FULL
OD_FINDINGS: FULL

## 2023-11-25 ASSESSMENT — REFRACTION_AUTOREFRACTION
OD_AXIS: 085
OS_CYLINDER: +0.75
OS_SPHERE: -0.25
OD_SPHERE: 0.00
OS_AXIS: 107
OD_CYLINDER: +0.25

## 2023-11-25 ASSESSMENT — REFRACTION_CURRENTRX
OD_CYLINDER: SPHERE
OS_CYLINDER: SPH
OD_SPHERE: +1.25
OD_SPHERE: +2.00
OS_OVR_VA: 20/
OS_SPHERE: +2.00
OD_VPRISM_DIRECTION: SV
OS_VPRISM_DIRECTION: SV
OD_OVR_VA: 20/
OS_SPHERE: +1.25
OD_CYLINDER: SPH
OS_CYLINDER: SPHERE
OS_OVR_VA: 20/
OD_OVR_VA: 20/

## 2023-11-25 ASSESSMENT — REFRACTION_MANIFEST
OS_SPHERE: -0.50
OD_VA2: 20/25(J1)
OD_ADD: +2.50
OS_CYLINDER: SPH
OS_VA1: 20/20
OD_SPHERE: -0.50
OS_VA2: 20/25(J1)
OD_VA1: 20/20
OD_CYLINDER: SPH
OU_VA: 20/20
OS_ADD: +2.50

## 2023-11-25 ASSESSMENT — SPHEQUIV_DERIVED
OS_SPHEQUIV: 0.125
OD_SPHEQUIV: 0.125

## 2023-11-29 ENCOUNTER — OFFICE (OUTPATIENT)
Dept: URBAN - METROPOLITAN AREA CLINIC 38 | Facility: CLINIC | Age: 78
Setting detail: OPHTHALMOLOGY
End: 2023-11-29
Payer: MEDICARE

## 2023-11-29 DIAGNOSIS — H02.012: ICD-10-CM

## 2023-11-29 PROBLEM — H01.004 BLEPHARITIS; RIGHT UPPER LID, LEFT UPPER LID: Status: ACTIVE | Noted: 2023-11-22

## 2023-11-29 PROBLEM — H02.824 LID LESION; LEFT UPPER LID: Status: ACTIVE | Noted: 2023-11-22

## 2023-11-29 PROBLEM — H01.001 BLEPHARITIS; RIGHT UPPER LID, LEFT UPPER LID: Status: ACTIVE | Noted: 2023-11-22

## 2023-11-29 PROCEDURE — 67820 REVISE EYELASHES: CPT | Mod: E4

## 2023-11-29 ASSESSMENT — LID EXAM ASSESSMENTS
OD_BLEPHARITIS: RUL 1+ 2+
OS_COMMENTS: WITH DISTICHIASIS
OS_BLEPHARITIS: LUL 1+ 2+
OD_TRICHIASIS: RLL 2+

## 2023-11-29 ASSESSMENT — CONFRONTATIONAL VISUAL FIELD TEST (CVF)
OD_FINDINGS: FULL
OS_FINDINGS: FULL

## 2023-12-02 ASSESSMENT — REFRACTION_CURRENTRX
OD_VPRISM_DIRECTION: SV
OS_OVR_VA: 20/
OD_CYLINDER: SPH
OS_SPHERE: +2.00
OS_OVR_VA: 20/
OS_SPHERE: +1.25
OD_OVR_VA: 20/
OD_SPHERE: +1.25
OD_SPHERE: +2.00
OS_VPRISM_DIRECTION: SV
OD_OVR_VA: 20/
OS_CYLINDER: SPH
OD_CYLINDER: SPHERE
OS_CYLINDER: SPHERE

## 2023-12-02 ASSESSMENT — REFRACTION_MANIFEST
OD_VA1: 20/20
OD_VA2: 20/25(J1)
OD_SPHERE: -0.50
OS_VA1: 20/20
OS_VA2: 20/25(J1)
OD_ADD: +2.50
OS_SPHERE: -0.50
OU_VA: 20/20
OS_CYLINDER: SPH
OS_ADD: +2.50
OD_CYLINDER: SPH

## 2023-12-02 ASSESSMENT — AXIALLENGTH_DERIVED
OS_AL: 23.7276
OD_AL: 23.8678

## 2023-12-02 ASSESSMENT — KERATOMETRY
METHOD_AUTO_MANUAL: AUTO
OS_K2POWER_DIOPTERS: 43.50
OD_K1POWER_DIOPTERS: 42.25
OS_K1POWER_DIOPTERS: 42.50
OD_AXISANGLE_DEGREES: 078
OS_AXISANGLE_DEGREES: 090
OD_K2POWER_DIOPTERS: 43.00

## 2023-12-02 ASSESSMENT — REFRACTION_AUTOREFRACTION
OD_CYLINDER: +0.25
OD_SPHERE: 0.00
OD_AXIS: 085
OS_SPHERE: -0.25
OS_AXIS: 107
OS_CYLINDER: +0.75

## 2023-12-02 ASSESSMENT — SPHEQUIV_DERIVED
OS_SPHEQUIV: 0.125
OD_SPHEQUIV: 0.125

## 2023-12-03 PROBLEM — H16.201 KERATOCONJUNCTIVITIS; RIGHT EYE: Status: ACTIVE | Noted: 2023-11-29

## 2023-12-06 ENCOUNTER — APPOINTMENT (OUTPATIENT)
Dept: CT IMAGING | Facility: CLINIC | Age: 78
End: 2023-12-06
Payer: MEDICARE

## 2023-12-06 PROCEDURE — 74177 CT ABD & PELVIS W/CONTRAST: CPT | Mod: MH

## 2023-12-08 ENCOUNTER — RX RENEWAL (OUTPATIENT)
Age: 78
End: 2023-12-08

## 2024-01-09 ENCOUNTER — APPOINTMENT (OUTPATIENT)
Dept: UROLOGY | Facility: CLINIC | Age: 79
End: 2024-01-09
Payer: MEDICARE

## 2024-01-09 VITALS
WEIGHT: 196 LBS | HEIGHT: 70 IN | HEART RATE: 80 BPM | DIASTOLIC BLOOD PRESSURE: 88 MMHG | RESPIRATION RATE: 16 BRPM | SYSTOLIC BLOOD PRESSURE: 131 MMHG | OXYGEN SATURATION: 97 % | BODY MASS INDEX: 28.06 KG/M2 | TEMPERATURE: 98.4 F

## 2024-01-09 DIAGNOSIS — R39.9 UNSPECIFIED SYMPTOMS AND SIGNS INVOLVING THE GENITOURINARY SYSTEM: ICD-10-CM

## 2024-01-09 DIAGNOSIS — N40.0 BENIGN PROSTATIC HYPERPLASIA WITHOUT LOWER URINARY TRACT SYMPMS: ICD-10-CM

## 2024-01-09 PROCEDURE — 99214 OFFICE O/P EST MOD 30 MIN: CPT

## 2024-01-09 RX ORDER — FINASTERIDE 5 MG/1
5 TABLET, FILM COATED ORAL DAILY
Qty: 90 | Refills: 0 | Status: DISCONTINUED | COMMUNITY
Start: 2022-10-25 | End: 2024-01-09

## 2024-01-09 RX ORDER — OXYBUTYNIN CHLORIDE 5 MG/1
5 TABLET, EXTENDED RELEASE ORAL
Qty: 60 | Refills: 1 | Status: DISCONTINUED | COMMUNITY
Start: 2021-09-29 | End: 2024-01-09

## 2024-01-09 RX ORDER — MONTELUKAST SODIUM 10 MG/1
10 TABLET, FILM COATED ORAL
Refills: 0 | Status: DISCONTINUED | COMMUNITY
End: 2024-01-09

## 2024-01-09 RX ORDER — AMLODIPINE BESYLATE 5 MG/1
5 TABLET ORAL
Refills: 0 | Status: DISCONTINUED | COMMUNITY
End: 2024-01-09

## 2024-01-09 RX ORDER — PNV NO.95/FERROUS FUM/FOLIC AC 28MG-0.8MG
100 TABLET ORAL
Refills: 0 | Status: DISCONTINUED | COMMUNITY
End: 2024-01-09

## 2024-01-09 RX ORDER — GABAPENTIN 100 MG/1
100 CAPSULE ORAL
Refills: 0 | Status: DISCONTINUED | COMMUNITY
End: 2024-01-09

## 2024-01-09 RX ORDER — TAMSULOSIN HYDROCHLORIDE 0.4 MG/1
0.4 CAPSULE ORAL
Qty: 180 | Refills: 2 | Status: DISCONTINUED | COMMUNITY
End: 2024-01-09

## 2024-01-09 RX ORDER — AMILORIDE HYDROCHLORIDE 5 MG/1
5 TABLET ORAL
Refills: 0 | Status: ACTIVE | COMMUNITY

## 2024-01-09 RX ORDER — METHIMAZOLE 5 MG/1
5 TABLET ORAL
Refills: 0 | Status: ACTIVE | COMMUNITY

## 2024-01-09 RX ORDER — ICOSAPENT ETHYL 500 MG/1
CAPSULE ORAL
Refills: 0 | Status: DISCONTINUED | COMMUNITY
End: 2024-01-09

## 2024-01-09 RX ORDER — MIRABEGRON 50 MG/1
50 TABLET, FILM COATED, EXTENDED RELEASE ORAL
Qty: 30 | Refills: 1 | Status: DISCONTINUED | COMMUNITY
Start: 2021-12-06 | End: 2024-01-09

## 2024-01-09 RX ORDER — MONTELUKAST 10 MG/1
10 TABLET, FILM COATED ORAL
Refills: 0 | Status: ACTIVE | COMMUNITY

## 2024-01-09 RX ORDER — MAGNESIUM HYDROXIDE 400 MG/5ML
400 SUSPENSION ORAL
Refills: 0 | Status: DISCONTINUED | COMMUNITY
End: 2024-01-09

## 2024-01-09 RX ORDER — ROSUVASTATIN CALCIUM 5 MG/1
5 TABLET, FILM COATED ORAL
Refills: 0 | Status: DISCONTINUED | COMMUNITY
End: 2024-01-09

## 2024-01-25 RX ORDER — VIBEGRON 75 MG/1
75 TABLET, FILM COATED ORAL
Qty: 30 | Refills: 0 | Status: ACTIVE | COMMUNITY
Start: 2024-01-09 | End: 1900-01-01

## 2024-01-31 ENCOUNTER — OFFICE (OUTPATIENT)
Dept: URBAN - METROPOLITAN AREA CLINIC 38 | Facility: CLINIC | Age: 79
Setting detail: OPHTHALMOLOGY
End: 2024-01-31
Payer: MEDICARE

## 2024-01-31 DIAGNOSIS — H02.012: ICD-10-CM

## 2024-01-31 DIAGNOSIS — H35.412: ICD-10-CM

## 2024-01-31 DIAGNOSIS — H02.824: ICD-10-CM

## 2024-01-31 DIAGNOSIS — H16.201: ICD-10-CM

## 2024-01-31 DIAGNOSIS — H01.001: ICD-10-CM

## 2024-01-31 DIAGNOSIS — H01.004: ICD-10-CM

## 2024-01-31 DIAGNOSIS — H43.811: ICD-10-CM

## 2024-01-31 DIAGNOSIS — H25.13: ICD-10-CM

## 2024-01-31 DIAGNOSIS — H43.393: ICD-10-CM

## 2024-01-31 PROCEDURE — 92014 COMPRE OPH EXAM EST PT 1/>: CPT

## 2024-01-31 ASSESSMENT — REFRACTION_CURRENTRX
OD_OVR_VA: 20/
OS_VPRISM_DIRECTION: SV
OS_CYLINDER: SPHERE
OD_OVR_VA: 20/
OS_CYLINDER: SPH
OD_CYLINDER: SPHERE
OS_OVR_VA: 20/
OS_SPHERE: +2.00
OD_SPHERE: +2.00
OS_SPHERE: +1.25
OS_OVR_VA: 20/
OD_CYLINDER: SPH
OD_VPRISM_DIRECTION: SV
OD_SPHERE: +1.25

## 2024-01-31 ASSESSMENT — REFRACTION_MANIFEST
OU_VA: 20/20
OD_VA1: 20/20
OD_VA2: 20/25(J1)
OS_ADD: +2.50
OD_CYLINDER: SPH
OD_SPHERE: +0.50
OS_SPHERE: PLANO
OD_ADD: +2.50
OS_CYLINDER: SPH
OS_VA2: 20/25(J1)
OS_VA1: 20/25

## 2024-01-31 ASSESSMENT — LID EXAM ASSESSMENTS
OS_COMMENTS: WITH DISTICHIASIS
OS_BLEPHARITIS: LUL 1+ 2+
OD_BLEPHARITIS: RUL 1+ 2+
OD_TRICHIASIS: RLL 2+

## 2024-01-31 ASSESSMENT — CONFRONTATIONAL VISUAL FIELD TEST (CVF)
OS_FINDINGS: FULL
OD_FINDINGS: FULL

## 2024-05-02 ENCOUNTER — OFFICE (OUTPATIENT)
Dept: URBAN - METROPOLITAN AREA CLINIC 38 | Facility: CLINIC | Age: 79
Setting detail: OPHTHALMOLOGY
End: 2024-05-02
Payer: MEDICARE

## 2024-05-02 DIAGNOSIS — H16.203: ICD-10-CM

## 2024-05-02 DIAGNOSIS — H93.13: ICD-10-CM

## 2024-05-02 DIAGNOSIS — H90.3: ICD-10-CM

## 2024-05-02 PROCEDURE — 99213 OFFICE O/P EST LOW 20 MIN: CPT

## 2024-05-02 PROCEDURE — 92567 TYMPANOMETRY: CPT | Mod: AB | Performed by: AUDIOLOGIST

## 2024-05-02 PROCEDURE — 92557 COMPREHENSIVE HEARING TEST: CPT | Mod: AB | Performed by: AUDIOLOGIST

## 2024-05-02 ASSESSMENT — LID EXAM ASSESSMENTS
OD_TRICHIASIS: RLL 2+
OD_BLEPHARITIS: RUL 1+ 2+
OS_COMMENTS: WITH DISTICHIASIS
OS_BLEPHARITIS: LUL 1+ 2+

## 2024-05-02 ASSESSMENT — CONFRONTATIONAL VISUAL FIELD TEST (CVF)
OD_FINDINGS: FULL
OS_FINDINGS: FULL

## 2024-05-11 ENCOUNTER — OFFICE (OUTPATIENT)
Dept: URBAN - METROPOLITAN AREA CLINIC 38 | Facility: CLINIC | Age: 79
Setting detail: OPHTHALMOLOGY
End: 2024-05-11
Payer: MEDICARE

## 2024-05-11 ENCOUNTER — RX ONLY (RX ONLY)
Age: 79
End: 2024-05-11

## 2024-05-11 DIAGNOSIS — H16.223: ICD-10-CM

## 2024-05-11 DIAGNOSIS — H02.012: ICD-10-CM

## 2024-05-11 PROBLEM — H16.203 KERATOCONJUNCTIVITIS; BOTH EYES: Status: ACTIVE | Noted: 2024-05-02

## 2024-05-11 PROCEDURE — 99213 OFFICE O/P EST LOW 20 MIN: CPT

## 2024-05-11 PROCEDURE — 67820 REVISE EYELASHES: CPT | Mod: 25,E4

## 2024-05-11 ASSESSMENT — LID EXAM ASSESSMENTS
OD_BLEPHARITIS: RUL 1+ 2+
OS_COMMENTS: WITH DISTICHIASIS
OD_TRICHIASIS: RLL 2+
OS_BLEPHARITIS: LUL 1+ 2+

## 2024-05-11 ASSESSMENT — CONFRONTATIONAL VISUAL FIELD TEST (CVF)
OD_FINDINGS: FULL
OS_FINDINGS: FULL

## 2024-12-12 ENCOUNTER — OFFICE (OUTPATIENT)
Dept: URBAN - METROPOLITAN AREA CLINIC 9 | Facility: CLINIC | Age: 79
Setting detail: OPHTHALMOLOGY
End: 2024-12-12
Payer: MEDICARE

## 2024-12-12 DIAGNOSIS — H10.523: ICD-10-CM

## 2024-12-12 DIAGNOSIS — H02.012: ICD-10-CM

## 2024-12-12 DIAGNOSIS — H11.153: ICD-10-CM

## 2024-12-12 DIAGNOSIS — H25.13: ICD-10-CM

## 2024-12-12 PROCEDURE — 99213 OFFICE O/P EST LOW 20 MIN: CPT | Performed by: OPHTHALMOLOGY

## 2024-12-12 ASSESSMENT — REFRACTION_MANIFEST
OS_CYLINDER: SPH
OD_SPHERE: +0.50
OS_VA2: 20/25(J1)
OD_VA2: 20/25(J1)
OD_VA1: 20/20
OD_ADD: +2.50
OU_VA: 20/20
OS_VA1: 20/25
OS_ADD: +2.50
OS_SPHERE: PLANO
OD_CYLINDER: SPH

## 2024-12-12 ASSESSMENT — CONFRONTATIONAL VISUAL FIELD TEST (CVF)
OD_FINDINGS: FULL
OS_FINDINGS: FULL

## 2024-12-12 ASSESSMENT — LID EXAM ASSESSMENTS
OS_COMMENTS: WITH DISTICHIASIS
OD_BLEPHARITIS: RUL 2+ 3+
OS_BLEPHARITIS: LUL 2+ 3+
OD_TRICHIASIS: RLL 2+

## 2024-12-12 ASSESSMENT — REFRACTION_CURRENTRX
OS_SPHERE: +2.00
OD_SPHERE: +1.25
OD_SPHERE: +2.00
OD_VPRISM_DIRECTION: SV
OS_OVR_VA: 20/
OS_VPRISM_DIRECTION: SV
OD_OVR_VA: 20/
OS_CYLINDER: SPHERE
OS_OVR_VA: 20/
OS_SPHERE: +1.25
OD_CYLINDER: SPH
OS_CYLINDER: SPH
OD_CYLINDER: SPHERE
OD_OVR_VA: 20/

## 2024-12-12 ASSESSMENT — REFRACTION_AUTOREFRACTION
OS_SPHERE: +0.50
OD_CYLINDER: +0.25
OD_SPHERE: +0.25
OS_CYLINDER: +0.25
OD_AXIS: 033
OS_AXIS: 062

## 2024-12-12 ASSESSMENT — KERATOMETRY
METHOD_AUTO_MANUAL: AUTO
OS_AXISANGLE_DEGREES: 086
OS_K2POWER_DIOPTERS: 44.00
OD_K2POWER_DIOPTERS: 43.25
OD_AXISANGLE_DEGREES: 082
OS_K1POWER_DIOPTERS: 42.50
OD_K1POWER_DIOPTERS: 42.51

## 2024-12-12 ASSESSMENT — VISUAL ACUITY
OD_BCVA: 20/40
OS_BCVA: 20/30

## 2024-12-12 ASSESSMENT — SUPERFICIAL PUNCTATE KERATITIS (SPK)
OS_SPK: ABSENT
OD_SPK: ABSENT

## 2025-01-07 ENCOUNTER — APPOINTMENT (OUTPATIENT)
Dept: UROLOGY | Facility: CLINIC | Age: 80
End: 2025-01-07
Payer: MEDICARE

## 2025-01-07 VITALS
WEIGHT: 196 LBS | TEMPERATURE: 97.3 F | HEART RATE: 80 BPM | BODY MASS INDEX: 28.06 KG/M2 | HEIGHT: 70 IN | SYSTOLIC BLOOD PRESSURE: 155 MMHG | DIASTOLIC BLOOD PRESSURE: 81 MMHG

## 2025-01-07 DIAGNOSIS — N40.0 BENIGN PROSTATIC HYPERPLASIA WITHOUT LOWER URINARY TRACT SYMPMS: ICD-10-CM

## 2025-01-07 DIAGNOSIS — R97.20 ELEVATED PROSTATE, SPECIFIC ANTIGEN [PSA]: ICD-10-CM

## 2025-01-07 PROCEDURE — 99213 OFFICE O/P EST LOW 20 MIN: CPT

## 2025-01-07 RX ORDER — PANTOPRAZOLE SODIUM 40 MG/1
40 TABLET, DELAYED RELEASE ORAL
Refills: 0 | Status: ACTIVE | COMMUNITY

## 2025-02-04 ENCOUNTER — OFFICE (OUTPATIENT)
Dept: URBAN - METROPOLITAN AREA CLINIC 9 | Facility: CLINIC | Age: 80
Setting detail: OPHTHALMOLOGY
End: 2025-02-04
Payer: MEDICARE

## 2025-02-04 DIAGNOSIS — H16.223: ICD-10-CM

## 2025-02-04 DIAGNOSIS — H25.13: ICD-10-CM

## 2025-02-04 DIAGNOSIS — H01.001: ICD-10-CM

## 2025-02-04 DIAGNOSIS — H35.40: ICD-10-CM

## 2025-02-04 DIAGNOSIS — H01.004: ICD-10-CM

## 2025-02-04 DIAGNOSIS — H02.824: ICD-10-CM

## 2025-02-04 DIAGNOSIS — H10.523: ICD-10-CM

## 2025-02-04 DIAGNOSIS — H02.012: ICD-10-CM

## 2025-02-04 DIAGNOSIS — H35.412: ICD-10-CM

## 2025-02-04 DIAGNOSIS — H11.153: ICD-10-CM

## 2025-02-04 PROBLEM — H43.392 VITREOUS FLOATERS;  , LEFT EYE: Status: ACTIVE | Noted: 2025-02-04

## 2025-02-04 PROCEDURE — 92250 FUNDUS PHOTOGRAPHY W/I&R: CPT | Performed by: OPHTHALMOLOGY

## 2025-02-04 PROCEDURE — 92014 COMPRE OPH EXAM EST PT 1/>: CPT | Performed by: OPHTHALMOLOGY

## 2025-02-04 ASSESSMENT — KERATOMETRY
OS_AXISANGLE_DEGREES: 084
METHOD_AUTO_MANUAL: AUTO
OS_K1POWER_DIOPTERS: 42.50
OD_K2POWER_DIOPTERS: 44.00
OS_K2POWER_DIOPTERS: 44.00
OD_AXISANGLE_DEGREES: 094
OD_K1POWER_DIOPTERS: 42.50

## 2025-02-04 ASSESSMENT — REFRACTION_CURRENTRX
OD_OVR_VA: 20/
OD_SPHERE: +2.00
OS_VPRISM_DIRECTION: SV
OD_VPRISM_DIRECTION: SV
OS_SPHERE: +2.00
OD_SPHERE: +1.25
OS_CYLINDER: SPHERE
OS_CYLINDER: SPH
OD_CYLINDER: SPH
OS_OVR_VA: 20/
OD_OVR_VA: 20/
OS_OVR_VA: 20/
OS_SPHERE: +1.25
OD_CYLINDER: SPHERE

## 2025-02-04 ASSESSMENT — SUPERFICIAL PUNCTATE KERATITIS (SPK)
OS_SPK: 2+ 3+
OD_SPK: 2+ 3+

## 2025-02-04 ASSESSMENT — REFRACTION_MANIFEST
OS_AXIS: 085
OD_VA2: 20/25(J1)
OD_ADD: +2.50
OD_AXIS: 095
OS_ADD: +2.50
OS_CYLINDER: +0.50
OS_VA1: 20/40+2
OD_CYLINDER: +0.50
OU_VA: 20/30-2
OD_SPHERE: PLANO
OS_VA2: 20/30(J2)
OS_SPHERE: PLANO
OD_VA1: 20/30-2

## 2025-02-04 ASSESSMENT — CONFRONTATIONAL VISUAL FIELD TEST (CVF)
OS_FINDINGS: FULL
OD_FINDINGS: FULL

## 2025-02-04 ASSESSMENT — REFRACTION_AUTOREFRACTION
OS_CYLINDER: +0.50
OS_SPHERE: PLANO
OD_CYLINDER: +0.50
OD_AXIS: 093
OD_SPHERE: -0.25
OS_AXIS: 083

## 2025-02-04 ASSESSMENT — VISUAL ACUITY
OD_BCVA: 20/50
OS_BCVA: 20/40+2

## 2025-02-04 ASSESSMENT — LID EXAM ASSESSMENTS
OS_BLEPHARITIS: LUL 2+ 3+
OD_TRICHIASIS: RLL 2+
OD_BLEPHARITIS: RUL 2+

## 2025-02-04 ASSESSMENT — DECREASING TEAR LAKE - SEVERITY SCORE
OD_DEC_TEARLAKE: 2+
OS_DEC_TEARLAKE: 2+

## 2025-02-04 ASSESSMENT — TONOMETRY: OS_IOP_MMHG: 20

## 2025-07-18 ENCOUNTER — OFFICE (OUTPATIENT)
Dept: URBAN - METROPOLITAN AREA CLINIC 38 | Facility: CLINIC | Age: 80
Setting detail: OPHTHALMOLOGY
End: 2025-07-18
Payer: MEDICARE

## 2025-07-18 ENCOUNTER — RX ONLY (RX ONLY)
Age: 80
End: 2025-07-18

## 2025-07-18 DIAGNOSIS — H02.834: ICD-10-CM

## 2025-07-18 DIAGNOSIS — H16.223: ICD-10-CM

## 2025-07-18 DIAGNOSIS — H02.824: ICD-10-CM

## 2025-07-18 DIAGNOSIS — H01.004: ICD-10-CM

## 2025-07-18 DIAGNOSIS — H02.831: ICD-10-CM

## 2025-07-18 DIAGNOSIS — H00.025: ICD-10-CM

## 2025-07-18 DIAGNOSIS — H01.001: ICD-10-CM

## 2025-07-18 PROBLEM — H40.033 ANATOMICAL NARROW ANGLE; BOTH EYES: Status: ACTIVE | Noted: 2025-07-18

## 2025-07-18 PROCEDURE — 99213 OFFICE O/P EST LOW 20 MIN: CPT | Performed by: OPHTHALMOLOGY

## 2025-07-18 ASSESSMENT — REFRACTION_MANIFEST
OD_AXIS: 095
OD_SPHERE: PLANO
OD_VA2: 20/25(J1)
OU_VA: 20/30-2
OS_ADD: +2.50
OD_VA1: 20/30-2
OD_ADD: +2.50
OD_CYLINDER: +0.50
OS_SPHERE: PLANO
OS_VA1: 20/40+2
OS_AXIS: 085
OS_CYLINDER: +0.50
OS_VA2: 20/30(J2)

## 2025-07-18 ASSESSMENT — REFRACTION_CURRENTRX
OD_SPHERE: +1.25
OD_SPHERE: +2.00
OS_OVR_VA: 20/
OD_OVR_VA: 20/
OS_SPHERE: +1.25
OS_CYLINDER: SPH
OD_CYLINDER: SPHERE
OS_VPRISM_DIRECTION: SV
OS_OVR_VA: 20/
OD_VPRISM_DIRECTION: SV
OD_OVR_VA: 20/
OS_CYLINDER: SPHERE
OD_CYLINDER: SPH
OS_SPHERE: +2.00

## 2025-07-18 ASSESSMENT — SUPERFICIAL PUNCTATE KERATITIS (SPK)
OS_SPK: 2+ 3+
OD_SPK: 2+ 3+

## 2025-07-18 ASSESSMENT — KERATOMETRY
METHOD_AUTO_MANUAL: AUTO
OD_K1POWER_DIOPTERS: 42.50
OD_AXISANGLE_DEGREES: 070
OS_K2POWER_DIOPTERS: 44.00
OS_AXISANGLE_DEGREES: 084
OD_K2POWER_DIOPTERS: 43.00
OS_K1POWER_DIOPTERS: 43.00

## 2025-07-18 ASSESSMENT — CONFRONTATIONAL VISUAL FIELD TEST (CVF)
OD_FINDINGS: FULL
OS_FINDINGS: FULL

## 2025-07-18 ASSESSMENT — DECREASING TEAR LAKE - SEVERITY SCORE
OS_DEC_TEARLAKE: 2+
OD_DEC_TEARLAKE: 2+

## 2025-07-18 ASSESSMENT — REFRACTION_AUTOREFRACTION
OD_SPHERE: +1.00
OS_SPHERE: +0.75
OS_CYLINDER: SPH
OD_CYLINDER: -0.25
OD_AXIS: 106

## 2025-07-18 ASSESSMENT — TONOMETRY
OD_IOP_MMHG: 19
OS_IOP_MMHG: 20

## 2025-07-18 ASSESSMENT — LID POSITION - DERMATOCHALASIS
OD_DERMATOCHALASIS: RUL 1+
OS_DERMATOCHALASIS: LUL 1+

## 2025-07-18 ASSESSMENT — LID EXAM ASSESSMENTS
OD_BLEPHARITIS: RUL 2+
OS_BLEPHARITIS: LUL 2+ 3+

## 2025-07-18 ASSESSMENT — VISUAL ACUITY
OD_BCVA: 20/50
OS_BCVA: 20/40-